# Patient Record
Sex: FEMALE
[De-identification: names, ages, dates, MRNs, and addresses within clinical notes are randomized per-mention and may not be internally consistent; named-entity substitution may affect disease eponyms.]

---

## 2024-02-05 ENCOUNTER — APPOINTMENT (OUTPATIENT)
Dept: ORTHOPEDIC SURGERY | Facility: CLINIC | Age: 39
End: 2024-02-05
Payer: COMMERCIAL

## 2024-02-05 VITALS — BODY MASS INDEX: 33.49 KG/M2 | HEIGHT: 62 IN | WEIGHT: 182 LBS

## 2024-02-05 DIAGNOSIS — M25.552 PAIN IN LEFT HIP: ICD-10-CM

## 2024-02-05 PROBLEM — Z00.00 ENCOUNTER FOR PREVENTIVE HEALTH EXAMINATION: Status: ACTIVE | Noted: 2024-02-05

## 2024-02-05 PROCEDURE — 99202 OFFICE O/P NEW SF 15 MIN: CPT

## 2024-02-05 NOTE — HISTORY OF PRESENT ILLNESS
[de-identified] : Ms. WAGNER CERDA is a 38 year female who presents to office complaining of right pelvic bony lesion. She was initially seen a few weeks ago in the ED for left hip/pelvic pain which was determined to be due to a UTI. In the process of assessing this, she was told she has a "lesion" in her right pelvic area, and was advised to obtain x-rays and follow up with an orthopedist. She has no pain or complaints with her hip at this time. She can do all of her ADLs without issue. She is here today simply for further evaluation of the bony lesion detected incidentally.  All review of systems, family history, social history, surgical history, past medical history, medications, and allergies not previously stated as positive are negative. They were reviewed by me today with the patient and documented accordingly.

## 2024-02-05 NOTE — PHYSICAL EXAM
[de-identified] : Constitutional: Well-nourished, well-developed, No acute distress Respiratory:  Good respiratory effort, no SOB Lymphatic: No regional lymphadenopathy, no lymphedema Psychiatric: Pleasant and normal affect, alert and oriented x3 Musculoskeletal: normal except where as noted in regional exam  Bilateral hips:  APPEARANCE: no marked deformities, no swelling or malalignment POSITIVE TENDERNESS: none NONTENDER: greater trochanter, TFL, gluteal region, ischium/proximal hamstring region, hip flexor region, sartorius and pubic symphysis.  ROM: full & painless.  RESISTIVE TESTING: painless resisted flex/ext, ER/IR/SLR/abduction/adduction.  SPECIAL TESTS: neg ADILENE/FADIR, painless loaded flexion & scouring  [de-identified] : I reviewed, interpreted and clinically correlated the following outside imaging studies, EXAM:  RADIOGRAPHS PELVIS AND BOTH HIPS (3 VIEWS)  HISTORY:  The patient is a 2-year-old female, found on outside radiographs to have a "cyst" in the right iliac bone.  TECHNIQUE:  AP and frog-lateral radiographs of both hips were obtained. The entire pelvis was included on the AP view.   FINDINGS:  The bones are of normal density. There is no sign of fracture there appears to be a bone island in the right iliac bone on the upper portion of the right iliac wing near the right SI joint. No radiolucent lesion is seen. iliac wing near the right SI joint. No radiolucent lesion is seen. No other blastic or lytic changes are seen. There is subtle mild arthrosis in both hips, with bilateral joint space narrowing, smooth articular cortices. There is no sign of avascular necrosis within the femoral heads. No obvious intra-articular loose bodies are seen. There are no abnormal soft tissue densities.  IMPRESSION:  1. Bilateral mild hip arthrosis. 2. Apparent bone island in the upper portion of the right iliac wing. No "bone cyst" is seen.  Thank you for the opportunity to participate in the care of this patient.     JOVAN ODOM MD  - Electronically Signed: 01- 1:09 PM  Physician to Physician Direct Line is: [phone] Copy to:HERMINIA BONE MD

## 2024-02-05 NOTE — DISCUSSION/SUMMARY
[de-identified] : Patient was seen today for evaluation management of right iliac bone island found on x-ray.  Patient was recently seen for left inguinal and hip pain at an urgent care center, patient was diagnosed and treated for a UTI at that time, she has had resolution of her left hip and groin pain.  There is an x-ray done at that time and there was an incidental finding of a bone island in the right iliac bone.  Patient was advised to follow-up with orthopedic regarding this finding.  Patient has no history of right hip pain, she has no current right hip pain.  Patient was given reassurance that a bone island is a benign incidental finding, this does not require any intervention at this time.  There is no monitoring that is needed for this x-ray finding.  Patient feels very reassured with this information.  Follow up as needed.  Patient appreciates and agrees with current plan.  A  was utilized to communicate the patient's primary language.  This note was generated using dragon medical dictation software.  A reasonable effort has been made for proofreading its contents, but typos may still remain.  If there are any questions or points of clarification needed please notify my office.

## 2024-02-05 NOTE — REASON FOR VISIT
[Initial Visit] : an initial visit for [FreeTextEntry2] : right pelvic bony lesion [Interpreters_IDNumber] : [id number] [Interpreters_FullName] : Rosie [[email or web address]] : [place]